# Patient Record
Sex: FEMALE | Race: WHITE | NOT HISPANIC OR LATINO | URBAN - METROPOLITAN AREA
[De-identification: names, ages, dates, MRNs, and addresses within clinical notes are randomized per-mention and may not be internally consistent; named-entity substitution may affect disease eponyms.]

---

## 2024-08-14 ENCOUNTER — EMERGENCY (EMERGENCY)
Facility: HOSPITAL | Age: 64
LOS: 0 days | Discharge: HOME | End: 2024-08-14
Attending: EMERGENCY MEDICINE
Payer: COMMERCIAL

## 2024-08-14 VITALS
SYSTOLIC BLOOD PRESSURE: 153 MMHG | HEART RATE: 87 BPM | RESPIRATION RATE: 18 BRPM | OXYGEN SATURATION: 100 % | WEIGHT: 117.29 LBS | DIASTOLIC BLOOD PRESSURE: 74 MMHG | TEMPERATURE: 98 F

## 2024-08-14 VITALS — HEIGHT: 63 IN
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
## 2024-08-14 DIAGNOSIS — S01.511A LACERATION WITHOUT FOREIGN BODY OF LIP, INITIAL ENCOUNTER: ICD-10-CM

## 2024-08-14 DIAGNOSIS — S01.21XA LACERATION WITHOUT FOREIGN BODY OF NOSE, INITIAL ENCOUNTER: ICD-10-CM
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
## 2024-08-14 PROCEDURE — 99284 EMERGENCY DEPT VISIT MOD MDM: CPT

## 2024-08-14 PROCEDURE — 99283 EMERGENCY DEPT VISIT LOW MDM: CPT | Mod: 25

## 2024-08-14 PROCEDURE — 12052 INTMD RPR FACE/MM 2.6-5.0 CM: CPT

## 2024-08-14 RX ORDER — IBUPROFEN 200 MG
600 TABLET ORAL ONCE
Refills: 0 | Status: COMPLETED | OUTPATIENT
Start: 2024-08-14 | End: 2024-08-14

## 2024-08-14 RX ADMIN — Medication 600 MILLIGRAM(S): at 11:15

## 2024-08-14 NOTE — ED STATDOCS - OBJECTIVE STATEMENT
63 y/o female presents to the ED for lacerations s/p trip and fall. Pt reports she was going into work, tripped and fell. Pt hit her face on the ground. No LOC. Not on anticoagulation. Tdap UTD. No other complaints at this time.

## 2024-08-14 NOTE — ED STATDOCS - SKIN, MLM
skin normal color for race, warm, dry. Partial tissue avulsion of nasal bridge. Flap laceration involving vermillion border in middle of frenulum. No mandibular or jaw pain.

## 2024-08-14 NOTE — ED ADULT TRIAGE NOTE - CHIEF COMPLAINT QUOTE
pt presents to ED c/o laceration to top of lip and nose. pt tripped and fell this morning. Denies LOC denies anticoagulant use.

## 2024-08-14 NOTE — ED STATDOCS - PROGRESS NOTE DETAILS
pt requesting plastics, d/w plastics on call Dr. Feliz will come in for lac repair  Lashay Telles PA-C Patient seen and evaluated by plastics Dr. Elizabeth laceration repair and wound care and signs and symptoms of infection discussed will discharge home with outpatient follow-up with plastics return precautions given patient agreeable to discharge and plan of care  Lashay Telles PA-C

## 2024-08-14 NOTE — ED ADULT NURSE NOTE - OBJECTIVE STATEMENT
Pt ambulatory to the ED with daughter s/p fall. Pt c/o lacerations to bridge of nose and upper lip.  Pt reports she was going into work, tripped and fell. Pt hit her face on the ground. No LOC. Not on AC. reports pain 6/10. Ice packs provided, medicated for pain, awaiting plastics vat this time.

## 2024-08-14 NOTE — ED ADULT NURSE NOTE - NSFALLRISKINTERV_ED_ALL_ED

## 2024-08-14 NOTE — ED STATDOCS - CARE PROVIDER_API CALL
Miguel Ángel Feliz  Plastic Surgery  [address] [facility] [facility], [facility] [address]  [place] [address]  Phone: [phone]  Fax: [phone]  Follow Up Time:

## 2024-08-14 NOTE — ED STATDOCS - ATTENDING APP SHARED VISIT CONTRIBUTION OF CARE
I personally saw the patient with the KATHRINE, and completed the key components of the history and physical exam. I then discussed the management plan with the KATHRINE.

## 2024-08-14 NOTE — ED STATDOCS - PATIENT PORTAL LINK FT
You can access the FollowMyHealth Patient Portal offered by [facility] by registering at the following website: [email or web address]. By joining [facility]’s FollowMyHealth portal, you will also be able to view your health information using other applications (apps) compatible with our system.